# Patient Record
Sex: FEMALE | Race: BLACK OR AFRICAN AMERICAN | NOT HISPANIC OR LATINO | URBAN - METROPOLITAN AREA
[De-identification: names, ages, dates, MRNs, and addresses within clinical notes are randomized per-mention and may not be internally consistent; named-entity substitution may affect disease eponyms.]

---

## 2021-08-03 ENCOUNTER — OFFICE VISIT (OUTPATIENT)
Dept: URBAN - METROPOLITAN AREA CLINIC 29 | Facility: CLINIC | Age: 61
End: 2021-08-03

## 2021-08-03 PROBLEM — 238131007 OVERWEIGHT: Status: ACTIVE | Noted: 2021-08-03

## 2021-08-03 PROBLEM — 59621000 ESSENTIAL HYPERTENSION: Status: ACTIVE | Noted: 2021-08-03

## 2021-09-15 ENCOUNTER — OFFICE VISIT (OUTPATIENT)
Dept: URBAN - METROPOLITAN AREA SURGERY CENTER 7 | Facility: SURGERY CENTER | Age: 61
End: 2021-09-15

## 2022-04-30 ENCOUNTER — TELEPHONE ENCOUNTER (OUTPATIENT)
Dept: URBAN - METROPOLITAN AREA CLINIC 121 | Facility: CLINIC | Age: 62
End: 2022-04-30

## 2022-05-01 ENCOUNTER — TELEPHONE ENCOUNTER (OUTPATIENT)
Dept: URBAN - METROPOLITAN AREA CLINIC 121 | Facility: CLINIC | Age: 62
End: 2022-05-01

## 2022-05-01 RX ORDER — FAMOTIDINE 10 MG
TABLET ORAL
Status: ACTIVE | COMMUNITY
Start: 2010-09-28

## 2022-05-01 RX ORDER — MONTELUKAST SODIUM 10 MG/1
TABLET, FILM COATED ORAL
Status: ACTIVE | COMMUNITY
Start: 2010-09-28

## 2022-05-01 RX ORDER — POLYETHYLENE GLYCOL 3350, SODIUM SULFATE, SODIUM CHLORIDE, POTASSIUM CHLORIDE, ASCORBIC ACID, SODIUM ASCORBATE 140-9-5.2G
MIX AND USE DIRECTED.   USE CO-PAY CARD BELOW:  BIN 019158  PCN: CNRX GROUP: AC68037003 ID: 39275793763 KIT ORAL
Status: ACTIVE | COMMUNITY
Start: 2021-08-03

## 2022-05-01 RX ORDER — POLYETHYLENE GLYCOL 3350, SODIUM SULFATE, SODIUM CHLORIDE, POTASSIUM CHLORIDE, ASCORBIC ACID, SODIUM ASCORBATE 140-9-5.2G
MIX AND USE DIRECTED.   USE CO-PAY CARD BELOW:  BIN 019158  PCN: CNRX GROUP: AC68037003 ID: 39275793763 KIT ORAL
Status: ACTIVE | COMMUNITY
Start: 2021-08-20

## 2022-05-01 RX ORDER — ALBUTEROL SULFATE 108 UG/1
AEROSOL, METERED RESPIRATORY (INHALATION)
Status: ACTIVE | COMMUNITY
Start: 2010-09-28

## 2022-05-01 RX ORDER — CETIRIZINE HYDROCHLORIDE 10 MG/1
TABLET, FILM COATED ORAL
Status: ACTIVE | COMMUNITY
Start: 2010-09-28

## 2022-05-01 RX ORDER — CALC/FA/B/D3/E/BIOFLAV/SOYBEAN 600 MG-1MG
CAPSULE ORAL
Status: ACTIVE | COMMUNITY
Start: 2010-09-28

## 2022-05-01 RX ORDER — MOMETASONE FUROATE 1 MG/G
CREAM TOPICAL
Status: ACTIVE | COMMUNITY
Start: 2010-09-28

## 2024-01-17 ENCOUNTER — APPOINTMENT (RX ONLY)
Dept: URBAN - METROPOLITAN AREA CLINIC 33 | Facility: CLINIC | Age: 64
Setting detail: DERMATOLOGY
End: 2024-01-17

## 2024-01-17 DIAGNOSIS — L81.4 OTHER MELANIN HYPERPIGMENTATION: ICD-10-CM

## 2024-01-17 DIAGNOSIS — L65.9 NONSCARRING HAIR LOSS, UNSPECIFIED: ICD-10-CM

## 2024-01-17 PROCEDURE — 99203 OFFICE O/P NEW LOW 30 MIN: CPT

## 2024-01-17 PROCEDURE — ? SUNSCREEN RECOMMENDATIONS

## 2024-01-17 PROCEDURE — ? COUNSELING

## 2024-01-17 PROCEDURE — ? TREATMENT REGIMEN

## 2024-01-17 ASSESSMENT — LOCATION ZONE DERM
LOCATION ZONE: FACE
LOCATION ZONE: SCALP

## 2024-01-17 ASSESSMENT — LOCATION SIMPLE DESCRIPTION DERM
LOCATION SIMPLE: SUPERIOR FOREHEAD
LOCATION SIMPLE: RIGHT FOREHEAD
LOCATION SIMPLE: LEFT FOREHEAD
LOCATION SIMPLE: SCALP

## 2024-01-17 ASSESSMENT — LOCATION DETAILED DESCRIPTION DERM
LOCATION DETAILED: RIGHT LATERAL FOREHEAD
LOCATION DETAILED: LEFT INFERIOR MEDIAL FOREHEAD
LOCATION DETAILED: SUPERIOR MID FOREHEAD
LOCATION DETAILED: LEFT CENTRAL FRONTAL SCALP

## 2024-01-17 NOTE — HPI: HAIR LOSS
How Did The Hair Loss Occur?: gradual in onset
How Severe Is Your Hair Loss?: mild
What Hair Products Do You Use?: [employer]
Additional History: Patient reports hair loss x years. Patient has taken doxycycline from another dermatologist 5 years ago. Patient denies hx of biopsy for problem. She feels hair loss is stable at this time - denies observing continued hair loss for several years.

## 2024-01-17 NOTE — PROCEDURE: TREATMENT REGIMEN
Otc Regimen: Minoxidil 5%
Plan: Favor lichen planopilaris. Patient states problem is stable and asymptomatic. Denies observing hair loss for several years. Patient currently takes hydroxychloroquine for RA.\\nRecommended OTC minodixil 5% and RTC if hair loss recurs or other symptoms develop.
Detail Level: Zone